# Patient Record
Sex: FEMALE | Race: WHITE
[De-identification: names, ages, dates, MRNs, and addresses within clinical notes are randomized per-mention and may not be internally consistent; named-entity substitution may affect disease eponyms.]

---

## 2019-10-23 ENCOUNTER — HOSPITAL ENCOUNTER (OUTPATIENT)
Dept: HOSPITAL 92 - ERS | Age: 54
Setting detail: OBSERVATION
LOS: 1 days | Discharge: HOME | End: 2019-10-24
Attending: FAMILY MEDICINE | Admitting: FAMILY MEDICINE
Payer: SELF-PAY

## 2019-10-23 ENCOUNTER — HOSPITAL ENCOUNTER (EMERGENCY)
Dept: HOSPITAL 57 - BURERS | Age: 54
Discharge: ANOTHER HOSPITAL | End: 2019-10-23
Payer: SELF-PAY

## 2019-10-23 VITALS — BODY MASS INDEX: 33.3 KG/M2

## 2019-10-23 DIAGNOSIS — J18.0: Primary | ICD-10-CM

## 2019-10-23 DIAGNOSIS — E11.9: ICD-10-CM

## 2019-10-23 DIAGNOSIS — F32.9: ICD-10-CM

## 2019-10-23 DIAGNOSIS — Z87.891: ICD-10-CM

## 2019-10-23 DIAGNOSIS — E78.5: ICD-10-CM

## 2019-10-23 DIAGNOSIS — E87.2: ICD-10-CM

## 2019-10-23 DIAGNOSIS — I10: ICD-10-CM

## 2019-10-23 DIAGNOSIS — E78.00: ICD-10-CM

## 2019-10-23 DIAGNOSIS — Z79.899: ICD-10-CM

## 2019-10-23 DIAGNOSIS — J18.9: Primary | ICD-10-CM

## 2019-10-23 DIAGNOSIS — K76.0: ICD-10-CM

## 2019-10-23 DIAGNOSIS — E86.0: ICD-10-CM

## 2019-10-23 DIAGNOSIS — J45.901: ICD-10-CM

## 2019-10-23 LAB
ALBUMIN SERPL BCG-MCNC: 4.3 G/DL (ref 3.5–5)
ALP SERPL-CCNC: 117 U/L (ref 40–110)
ALT SERPL W P-5'-P-CCNC: 23 U/L (ref 8–55)
ANION GAP SERPL CALC-SCNC: 18 MMOL/L (ref 10–20)
AST SERPL-CCNC: 18 U/L (ref 5–34)
BACTERIA UR QL AUTO: (no result) HPF
BASOPHILS # BLD AUTO: 0.1 THOU/UL (ref 0–0.2)
BASOPHILS NFR BLD AUTO: 1.1 % (ref 0–1)
BILIRUB SERPL-MCNC: 0.6 MG/DL (ref 0.2–1.2)
BUN SERPL-MCNC: 8 MG/DL (ref 9.8–20.1)
CALCIUM SERPL-MCNC: 9.8 MG/DL (ref 7.8–10.44)
CHLORIDE SERPL-SCNC: 103 MMOL/L (ref 98–107)
CO2 SERPL-SCNC: 27 MMOL/L (ref 22–29)
CREAT CL PREDICTED SERPL C-G-VRATE: 0 ML/MIN (ref 70–130)
EOSINOPHIL # BLD AUTO: 0.2 THOU/UL (ref 0–0.7)
EOSINOPHIL NFR BLD AUTO: 1.6 % (ref 0–10)
GLOBULIN SER CALC-MCNC: 4 G/DL (ref 2.4–3.5)
GLUCOSE SERPL-MCNC: 109 MG/DL (ref 70–105)
HGB BLD-MCNC: 14.8 G/DL (ref 12–16)
LYMPHOCYTES # BLD AUTO: 1.6 THOU/UL (ref 1.2–3.4)
LYMPHOCYTES NFR BLD AUTO: 16.6 % (ref 21–51)
MCH RBC QN AUTO: 31.2 PG (ref 27–31)
MCV RBC AUTO: 96.9 FL (ref 78–98)
MONOCYTES # BLD AUTO: 0.7 THOU/UL (ref 0.11–0.59)
MONOCYTES NFR BLD AUTO: 7 % (ref 0–10)
MUCOUS THREADS UR QL AUTO: (no result) LPF
NEUTROPHILS # BLD AUTO: 7.1 THOU/UL (ref 1.4–6.5)
NEUTROPHILS NFR BLD AUTO: 73.8 % (ref 42–75)
PLATELET # BLD AUTO: 235 THOU/UL (ref 130–400)
POTASSIUM SERPL-SCNC: 3.8 MMOL/L (ref 3.5–5.1)
PROT UR STRIP.AUTO-MCNC: 30 MG/DL
RBC # BLD AUTO: 4.73 MILL/UL (ref 4.2–5.4)
SODIUM SERPL-SCNC: 144 MMOL/L (ref 136–145)
WBC # BLD AUTO: 9.6 THOU/UL (ref 4.8–10.8)
WBC UR QL AUTO: (no result) HPF (ref 0–3)

## 2019-10-23 PROCEDURE — 96365 THER/PROPH/DIAG IV INF INIT: CPT

## 2019-10-23 PROCEDURE — 96361 HYDRATE IV INFUSION ADD-ON: CPT

## 2019-10-23 PROCEDURE — 87040 BLOOD CULTURE FOR BACTERIA: CPT

## 2019-10-23 PROCEDURE — 81003 URINALYSIS AUTO W/O SCOPE: CPT

## 2019-10-23 PROCEDURE — 36415 COLL VENOUS BLD VENIPUNCTURE: CPT

## 2019-10-23 PROCEDURE — 96366 THER/PROPH/DIAG IV INF ADDON: CPT

## 2019-10-23 PROCEDURE — G0378 HOSPITAL OBSERVATION PER HR: HCPCS

## 2019-10-23 PROCEDURE — 83605 ASSAY OF LACTIC ACID: CPT

## 2019-10-23 PROCEDURE — 87804 INFLUENZA ASSAY W/OPTIC: CPT

## 2019-10-23 PROCEDURE — 81015 MICROSCOPIC EXAM OF URINE: CPT

## 2019-10-23 PROCEDURE — 71046 X-RAY EXAM CHEST 2 VIEWS: CPT

## 2019-10-23 PROCEDURE — 85007 BL SMEAR W/DIFF WBC COUNT: CPT

## 2019-10-23 PROCEDURE — 80048 BASIC METABOLIC PNL TOTAL CA: CPT

## 2019-10-23 PROCEDURE — 85025 COMPLETE CBC W/AUTO DIFF WBC: CPT

## 2019-10-23 PROCEDURE — 80053 COMPREHEN METABOLIC PANEL: CPT

## 2019-10-23 PROCEDURE — 94640 AIRWAY INHALATION TREATMENT: CPT

## 2019-10-23 PROCEDURE — 87633 RESP VIRUS 12-25 TARGETS: CPT

## 2019-10-23 PROCEDURE — 85027 COMPLETE CBC AUTOMATED: CPT

## 2019-10-23 PROCEDURE — 96374 THER/PROPH/DIAG INJ IV PUSH: CPT

## 2019-10-23 NOTE — HP
PRIMARY CARE PROVIDER:  Bianca Koenig of [place], [place].



CHIEF COMPLAINT:  Cough, shortness of breath, general body aches.



HISTORY OF PRESENT ILLNESS:  This is a 54-year-old female who initially presented to

[facility] complaining of sore throat, general body aches, cough

and fever.  The patient states she had symptoms for approximately 1 week, taking

over-the-counter preparations for mild relief.  The patient states she took NyQuil

on a regular basis with some resolution of her symptoms in one week.  The patient

states she had approximately 2 days without symptoms, regaining the similar symptoms

of upper respiratory infection within the last 24 to 48 hours.  The patient admits

to body aches, low-grade fever that was undocumented, cough, congestion, rhinorrhea.

 The patient denies any prior history of influenza and states she had walking

pneumonia in the past.  The patient reports a prior history of smoking but none

currently.  The patient also reports her  has had similar symptoms in the

last 24 to 48 hours.  The patient denies having influenza or pneumonia vaccination

which were current.  In the emergency room, the patient underwent general evaluation

including chest imaging showing questionable infiltrate in the left lower lobe.  The

patient's metabolic screening showed mild lactic acidosis and the patient received

IV Rocephin in addition to normal saline and ibuprofen.  The patient was transferred

to the [facility] Emergency Department for further evaluation. 



PAST MEDICAL HISTORY:  

1. Hypertension.

2. Hyperlipidemia.

3. Fatty liver disease.

4. Diabetes mellitus, diet controlled.



PAST SURGICAL HISTORY:  

1. Status post right wrist surgery x2.

2. Status post right elbow repair.

3. Status post left shoulder repair.



PSYCHIATRIC HISTORY:  Positive for depression.



CURRENT MEDICATIONS:  

1. BuSpar 15 mg p.o. b.i.d.

2. Lipitor 10 mg p.o. daily.

3. Metoprolol succinate 100 mg p.o. daily. 

4. Losartan 25 mg p.o. daily.

5. HCTZ 25 mg p.o. daily.

6. Gabapentin 300 mg p.o. t.i.d.



ALLERGIES:  NO KNOWN DRUG ALLERGIES.



FAMILY HISTORY:  Positive for hypertension and diabetes mellitus.



SOCIAL HISTORY:  The patient formally used tobacco products, none currently.  No

alcohol or illicit drug use.  . 



REVIEW OF SYSTEMS:  CONSTITUTIONAL:  Negative for weight loss or gain, ability to

conduct usual activities. 

SKIN:  Negative for rash, itching. 

EYES:  Negative for double vision, pain. 

ENT/MOUTH:  Negative for nose bleeding, neck stiffness, pain, tenderness. 

CARDIOVASCULAR:  Negative for palpitations, dyspnea on exertion, orthopnea. 

RESPIRATORY:  Negative for shortness of breath, wheezing, cough, hemoptysis, fever

or night sweats. 

GASTROINTESTINAL:  Negative for poor appetite, abdominal pain, heartburn, nausea,

vomiting, constipation, or diarrhea. 

GENITOURINARY:  Negative for urgency, frequency, dysuria, nocturia. 

MUSCULOSKELETAL:  Negative for pain, swelling. 

NEUROLOGIC/PSYCHIATRIC:  Negative for anxiety, depression. 

ALLERGY/IMMUNOLOGIC:  Negative for skin rash, bleeding tendency. 



Otherwise negative except as stated per HPI.



PHYSICAL EXAMINATION:

VITAL SIGNS:  On admission, blood pressure 187/110, pulse 110, respiratory rate 18,

temperature 99.8 degrees Fahrenheit, O2 saturation 96% on room air. 

GENERAL APPEARANCE:  This is a 54-year-old female, alert and oriented x3, pleasant,

in mild distress. 

HEENT:  Pupils are equal, round, reactive to light and accommodation.  Extraocular

muscles are intact.  No scleral icterus.  No conjunctival injection.  Nares patent.

OP is clear.  Oral mucosa dry. 

NECK:  Supple.  No cervical adenopathy.  No thyromegaly.  No carotid bruits.  No JVD

appreciated.  Cervical spine with full active and passive range of motion.  No

meningeal signs noted. 

CHEST:  Lungs are clear to auscultation bilaterally.  CARDIOVASCULAR:  S1, S2 with

tachycardia.  No murmur, rub, or gallop appreciated. 

ABDOMEN:  Rounded, soft, nontender, and nondistended.  Bowel sounds are positive in

all 4 quadrants.  There is no hepatosplenomegaly.  No abdominal bruits.  No rebound

or guarding appreciated. 

EXTREMITIES:  Warm and dry with fair turgor.  No clubbing, cyanosis, or asymmetric

edema appreciated.  Pulses palpable distally at the dorsalis pedis, posterior

tibial, and popliteal arteries bilaterally.  Capillary refill less than 2 seconds. 

NEUROLOGIC:  Cranial nerves 2 through 12 are grossly intact.  No focal or

lateralizing signs appreciated. 



PERTINENT LABORATORY AND X-RAY FINDINGS:  Sodium 144, potassium 3.8, chloride 103,

CO2 of 27, BUN 8, creatinine 0.87, glucose 109.  Lactic acid level 2.8, calcium 9.8.

 LFTs within normal limits.  Albumin 4.3.  CBC showed a white blood cell count of

9.6, hemoglobin 15, hematocrit 46, platelet count 235 with 74% neutrophils.

Urinalysis showed specific gravity of 1.028, pH of 5, positive protein, small

bilirubin with 4 to 6 wbc's per high-power field, 7 to 10 squamous epithelial cells.

 Portable chest x-ray dated 10/23/2019, by my review shows questionable early

infiltrate in the left lower lobe. 



ASSESSMENT AND PLAN:  

1. Pneumonia, suspected community-acquired with gram-positive cocci.  The patient

will be observed on the medical unit.  We will continue Rocephin 2 g IV q.24 hours

with initial Zithromax 500 mg IV daily.  Add DuoNeb q.4 hours.  Mucolytics and

antitussive agents as needed.  Blood cultures x2 pending.  Continue intravenous

normal saline at 125 mL/hour. 

2. Lactic acidosis.  Suspect multifactorial including #1 in addition to dehydration.

 Continue IV fluids with normal saline at 125 mL/hour and repeat lactic acid level

per protocol. 

3. Hypertension.  Labile.  Resume home antihypertensive regimen once confirmed.

P.r.n. hydralazine and clonidine.  Serial blood pressure monitoring. 

4. Dehydration.  See #2 above.  Continue IV fluids as outlined previously and

encourage free water intake orally. 

5. Prophylaxis.  SCDs while in bed.  Pepcid 20 mg p.o. b.i.d.  Update influenza and

pneumonia vaccination prior to discharge. 



CODE STATUS:  Full.  Surrogate medical decision maker is the patient's spouse.







Job ID:  [id number]

## 2019-10-23 NOTE — RAD
CHEST TWO VIEWS:

10/23/19

 

The heart is normal in size and the lungs are clear.  There is no sign of infiltrate to suggest pneum
onia. There are no effusions. The mediastinum was unremarkable.

 

IMPRESSION: 

No acute finding. 

 

POS: HOME

## 2019-10-24 VITALS — SYSTOLIC BLOOD PRESSURE: 142 MMHG | TEMPERATURE: 98.7 F | DIASTOLIC BLOOD PRESSURE: 78 MMHG

## 2019-10-24 LAB
ANION GAP SERPL CALC-SCNC: 12 MMOL/L (ref 10–20)
BUN SERPL-MCNC: 5 MG/DL (ref 9.8–20.1)
CALCIUM SERPL-MCNC: 8 MG/DL (ref 7.8–10.44)
CHLORIDE SERPL-SCNC: 109 MMOL/L (ref 98–107)
CO2 SERPL-SCNC: 24 MMOL/L (ref 22–29)
CREAT CL PREDICTED SERPL C-G-VRATE: 114 ML/MIN (ref 70–130)
GLUCOSE SERPL-MCNC: 116 MG/DL (ref 70–105)
HGB BLD-MCNC: 12 G/DL (ref 12–16)
MCH RBC QN AUTO: 32.6 PG (ref 27–31)
MCV RBC AUTO: 96.6 FL (ref 78–98)
MDIFF COMPLETE?: YES
PLATELET # BLD AUTO: 191 THOU/UL (ref 130–400)
POTASSIUM SERPL-SCNC: 3.5 MMOL/L (ref 3.5–5.1)
RBC # BLD AUTO: 3.68 MILL/UL (ref 4.2–5.4)
SODIUM SERPL-SCNC: 141 MMOL/L (ref 136–145)
WBC # BLD AUTO: 7.4 THOU/UL (ref 4.8–10.8)

## 2019-10-24 RX ADMIN — GUAIFENESIN PRN MG: 200 SOLUTION ORAL at 05:49

## 2019-10-24 RX ADMIN — GUAIFENESIN PRN MG: 200 SOLUTION ORAL at 11:46

## 2019-10-24 NOTE — DIS
DATE OF ADMISSION:  10/23/2019



DATE OF DISCHARGE:  10/24/2019



DISCHARGE DISPOSITION:  Home.



PRIMARY DISCHARGE DIAGNOSES:  Bronchopneumonia, likely due to viral illness.  Asthma

exacerbation. 



SECONDARY DISCHARGE DIAGNOSES:  Hypertension, diet-controlled diabetes, dyslipidemia.



PROCEDURES DONE DURING HOSPITALIZATION:  Chest x-ray done showed no acute finding.

Respiratory viral panel PCR detected rhinovirus.  Influenza A and B antigens were

negative.  Blood cultures x2, no growth.  H and H 12 and 35, platelet count 191 with

61% neutrophils.  BUN 5, creatinine 0.7.  Lactic acid 2.8.  Liver enzymes within

normal limits. 



DISCHARGE MEDICATIONS:  

1. BuSpar 15 mg twice daily.

2. Atorvastatin 10 mg p.o. at bedtime.

3. Celexa 60 mg p.o. daily.

4. Gabapentin 600 mg p.o. three times daily.

5. Hydrochlorothiazide 25 mg daily.

6. Losartan 25 mg daily.

7. Toprol- mg daily.

8. Albuterol nebulizer q.8 hourly p.r.n. 

9. Omnicef 300 mg p.o. twice daily for 5 days.

10. Mucinex 600 mg p.o. twice daily.

11. Prednisone 10 mg twice daily for three days, then daily for 3 days and to stop.



ALLERGIES:  NO KNOWN DRUG ALLERGIES.



DISCHARGE PLAN:  The patient to follow up with Dr. Arya Valenzuela in 1 week.



BRIEF COURSE DURING HOSPITALIZATION:  The patient initially came in with complaints

of cough, shortness of breath, generalized body aches, postnasal drip, and

congestion in her sinuses.  She has had these symptoms for nearly a week and a half

now.  The patient also has history of asthma.  She was placed under observation on

telemetry.  Initial suspicion was for possible bronchopneumonia.  Her virus panel

PCR came back positive for rhinovirus.  The patient had asthma exacerbation.  She

was given a dose of steroids along with a brief IV antibiotics which has been

discontinued.  She needs to continue steroid taper for 1 week along with albuterol

nebulization at home.  Likely viral infection precipitated her asthma exacerbation.

She is hemodynamically stable, ambulating prior to discharge.  She needs to see a

primary care physician in 1 week.  If the patient would develop fever or her

wheezing gets worse, she needs to come to the ER. 



I have seen and examined the patient on the day of discharge.







Job ID:  [id number]